# Patient Record
(demographics unavailable — no encounter records)

---

## 2022-01-01 NOTE — NUR
pt has lots of questions about breastfeeding, pumping, and feeding formula,
explained colstrum and that needs nothing else but colstrum, but maternal
choice to supplement formula if wants. she asked about pumping, again she is
aware it is a maternal choice to do it, but encouraged to latch baby on when
ever he is hungery feeding him for 10-15 mintues at a time.